# Patient Record
Sex: FEMALE
[De-identification: names, ages, dates, MRNs, and addresses within clinical notes are randomized per-mention and may not be internally consistent; named-entity substitution may affect disease eponyms.]

---

## 2019-09-24 ENCOUNTER — HOSPITAL ENCOUNTER (OUTPATIENT)
Dept: HOSPITAL 88 - ER | Age: 84
Setting detail: OBSERVATION
LOS: 2 days | Discharge: HOME | End: 2019-09-26
Attending: INTERNAL MEDICINE | Admitting: INTERNAL MEDICINE
Payer: MEDICARE

## 2019-09-24 VITALS — DIASTOLIC BLOOD PRESSURE: 62 MMHG | SYSTOLIC BLOOD PRESSURE: 98 MMHG

## 2019-09-24 VITALS — SYSTOLIC BLOOD PRESSURE: 111 MMHG | DIASTOLIC BLOOD PRESSURE: 66 MMHG

## 2019-09-24 VITALS — HEIGHT: 61 IN | WEIGHT: 116.75 LBS | BODY MASS INDEX: 22.04 KG/M2

## 2019-09-24 VITALS — DIASTOLIC BLOOD PRESSURE: 66 MMHG | SYSTOLIC BLOOD PRESSURE: 111 MMHG

## 2019-09-24 DIAGNOSIS — E78.5: ICD-10-CM

## 2019-09-24 DIAGNOSIS — I48.0: Primary | ICD-10-CM

## 2019-09-24 DIAGNOSIS — R53.83: ICD-10-CM

## 2019-09-24 DIAGNOSIS — I10: ICD-10-CM

## 2019-09-24 LAB
ALBUMIN SERPL-MCNC: 3.4 G/DL (ref 3.5–5)
ALBUMIN/GLOB SERPL: 0.8 {RATIO} (ref 0.8–2)
ALP SERPL-CCNC: 80 IU/L (ref 40–150)
ALT SERPL-CCNC: 13 IU/L (ref 0–55)
ANION GAP SERPL CALC-SCNC: 10.4 MMOL/L (ref 8–16)
BACTERIA URNS QL MICRO: (no result) /HPF
BASOPHILS # BLD AUTO: 0 10*3/UL (ref 0–0.1)
BASOPHILS NFR BLD AUTO: 0.4 % (ref 0–1)
BILIRUB UR QL: NEGATIVE
BUN SERPL-MCNC: 19 MG/DL (ref 7–26)
BUN/CREAT SERPL: 22 (ref 6–25)
CALCIUM SERPL-MCNC: 9.2 MG/DL (ref 8.4–10.2)
CHLORIDE SERPL-SCNC: 100 MMOL/L (ref 98–107)
CK MB SERPL-MCNC: 1.1 NG/ML (ref 0–5)
CK MB SERPL-MCNC: 1.1 NG/ML (ref 0–5)
CK SERPL-CCNC: 51 IU/L (ref 29–168)
CK SERPL-CCNC: 52 IU/L (ref 29–168)
CLARITY UR: CLEAR
CO2 SERPL-SCNC: 31 MMOL/L (ref 22–29)
COLOR UR: YELLOW
DEPRECATED APTT PLAS QN: 25.7 SECONDS (ref 23.8–35.5)
DEPRECATED INR PLAS: 0.99
DEPRECATED NEUTROPHILS # BLD AUTO: 3.3 10*3/UL (ref 2.1–6.9)
EGFRCR SERPLBLD CKD-EPI 2021: > 60 ML/MIN (ref 60–?)
EOSINOPHIL # BLD AUTO: 0 10*3/UL (ref 0–0.4)
EOSINOPHIL NFR BLD AUTO: 0.8 % (ref 0–6)
EPI CELLS URNS QL MICRO: (no result) /LPF
ERYTHROCYTE [DISTWIDTH] IN CORD BLOOD: 13.9 % (ref 11.7–14.4)
GLOBULIN PLAS-MCNC: 4.1 G/DL (ref 2.3–3.5)
GLUCOSE SERPLBLD-MCNC: 109 MG/DL (ref 74–118)
HCT VFR BLD AUTO: 38.6 % (ref 34.2–44.1)
HGB BLD-MCNC: 12.3 G/DL (ref 12–16)
KETONES UR QL STRIP.AUTO: NEGATIVE
LEUKOCYTE ESTERASE UR QL STRIP.AUTO: (no result)
LYMPHOCYTES # BLD: 1.2 10*3/UL (ref 1–3.2)
LYMPHOCYTES NFR BLD AUTO: 22.9 % (ref 18–39.1)
MCH RBC QN AUTO: 28.9 PG (ref 28–32)
MCHC RBC AUTO-ENTMCNC: 31.9 G/DL (ref 31–35)
MCV RBC AUTO: 90.8 FL (ref 81–99)
MONOCYTES # BLD AUTO: 0.6 10*3/UL (ref 0.2–0.8)
MONOCYTES NFR BLD AUTO: 11.5 % (ref 4.4–11.3)
NEUTS SEG NFR BLD AUTO: 64.2 % (ref 38.7–80)
NITRITE UR QL STRIP.AUTO: NEGATIVE
PLATELET # BLD AUTO: 384 X10E3/UL (ref 140–360)
POTASSIUM SERPL-SCNC: 3.4 MMOL/L (ref 3.5–5.1)
PROT UR QL STRIP.AUTO: (no result)
PROTHROMBIN TIME: 13.6 SECONDS (ref 11.9–14.5)
RBC # BLD AUTO: 4.25 X10E6/UL (ref 3.6–5.1)
SODIUM SERPL-SCNC: 138 MMOL/L (ref 136–145)
SP GR UR STRIP: 1.02 (ref 1.01–1.02)
UROBILINOGEN UR STRIP-MCNC: 0.2 MG/DL (ref 0.2–1)
WBC #/AREA URNS HPF: (no result) /HPF (ref 0–5)

## 2019-09-24 PROCEDURE — 71045 X-RAY EXAM CHEST 1 VIEW: CPT

## 2019-09-24 PROCEDURE — 85025 COMPLETE CBC W/AUTO DIFF WBC: CPT

## 2019-09-24 PROCEDURE — 94640 AIRWAY INHALATION TREATMENT: CPT

## 2019-09-24 PROCEDURE — 93005 ELECTROCARDIOGRAM TRACING: CPT

## 2019-09-24 PROCEDURE — 80053 COMPREHEN METABOLIC PANEL: CPT

## 2019-09-24 PROCEDURE — 85610 PROTHROMBIN TIME: CPT

## 2019-09-24 PROCEDURE — 82553 CREATINE MB FRACTION: CPT

## 2019-09-24 PROCEDURE — 83880 ASSAY OF NATRIURETIC PEPTIDE: CPT

## 2019-09-24 PROCEDURE — 85730 THROMBOPLASTIN TIME PARTIAL: CPT

## 2019-09-24 PROCEDURE — 36415 COLL VENOUS BLD VENIPUNCTURE: CPT

## 2019-09-24 PROCEDURE — 84484 ASSAY OF TROPONIN QUANT: CPT

## 2019-09-24 PROCEDURE — 93306 TTE W/DOPPLER COMPLETE: CPT

## 2019-09-24 PROCEDURE — 82550 ASSAY OF CK (CPK): CPT

## 2019-09-24 PROCEDURE — 99285 EMERGENCY DEPT VISIT HI MDM: CPT

## 2019-09-24 PROCEDURE — 81001 URINALYSIS AUTO W/SCOPE: CPT

## 2019-09-24 PROCEDURE — 84443 ASSAY THYROID STIM HORMONE: CPT

## 2019-09-24 RX ADMIN — AMIODARONE HYDROCHLORIDE SCH MG: 200 TABLET ORAL at 16:23

## 2019-09-24 RX ADMIN — APIXABAN SCH MG: 2.5 TABLET, FILM COATED ORAL at 16:23

## 2019-09-24 NOTE — XMS REPORT
Patient Summary Document

                             Created on: 2019



SHARLENE CARTER

External Reference #: [id number]

: 1935

Sex: Female



Demographics







                          Address                   [address] [place]  [address]

 

                          Home Phone                [phone]

 

                          Preferred Language        Unknown

 

                          Marital Status            Unknown

 

                          Ep[employer] Affiliation     Unknown

 

                          Race                      Unknown

 

                          Ethnic Group              Unknown





Author







                          [facility]                    [facility]

 

                          [facility]

 

                          Address                   Unknown

 

                          Phone                     Unavailable







Care Team Providers







                    Care Team Member Name    Role                Phone

 

                    WESLY QIU    Unavailable         Unavailable







Problems

This patient has no known problems.



Allergies, Adverse Reactions, Alerts

This patient has no known allergies or adverse reactions.



Medications

This patient has no known medications.



Results







           Test Description    Test Time    Test Comments    Text Results    Atomic Results    Result

 Comments

 

                CHEST SINGLE (PORTABLE)    2019 11:29:00                                                   

                                                       [place] [address]      Patient Name: SHARLENE CARTER                        
          MR #: [id number]                     : 1935                  
                Age/Sex: 83/F  Acct #: [id number]                             
Req #: [id number]  Adm Physician:                                               
      Ordered by: FRANCI BAEZA NP                            Report #: [id number]
[id number]        Location: ER                                      Room/Bed:         
           
___________________________________________________________________________________________________
   Procedure: [id number] DX/CHEST SINGLE (PORTABLE)  Exam Date:                  
          Exam Time:                                               REPORT 
STATUS: Signed    EXAMINATION:  CHEST SINGLE (PORTABLE)          INDICATION: Kalye
rtness of breath      COMPARISON: None           FINDINGS:      LINES/TUBES:None
     LUNGS:The lungs are hyperinflated. Prominent right apical pleural 
parenchymal   thickening/scarring. Bilateral upper lobe predominant tubular 
bronchiectasis   and interstitial scarring. Subsegmental atelectasis at the left
lung base.      PLEURA:No pleural effusion or pneumothorax.      MEDIASTINUM:The
cardiomediastinal silhouette appears mildlyAtherosclerotic   calcifications of 
the thoracic aorta.      BONES/SOFT TISSUES:No acute osseous injury.      
ABDOMEN:No free air under the diaphragm.         IMPRESSION:    Hyperinflated 
lungs with bilateral upper lobe predominant tubular   bronchiectasis and 
interstitial scarring consistent with chronic lung disease.   Prominent right 
apical pleural parenchymal thickening/scarring.      Mild cardiomegaly.      
Signed by: Umberto Ayala MD on 2019 11:35 AM        Dictated By: UMBERTO AYALA MD  Electronically Signed By: UMBERTO AYALA MD on 19 1135  Transcribed By: 
KAYLA on 19 1135       COPY TO:   FRANCI BAEZA NP

## 2019-09-24 NOTE — NUR
PT RECEIVED FROM ER VIA WHEEL CHAIR. AAOX3. FAMILY AT BEDSIDE. EDUCATED PT ABOUT FALL 
PRECAUTIONS. PT VERBALIZED UNDERSTANDING.  CALL LIGHT WITH IN EASY REACH. INSTRUCTED PT TO 
USE CALL LIGHT FOR ANY NEEDS. BED IS LOCKED. PT REFUSED BED ALARM. PT DENIES NEEDS AT THIS 
TIME.

## 2019-09-24 NOTE — NUR
EKG PERFORMED AT BEDSIDE, NOTED THAT PT IS IN A FIB WITH RVR, INFORMED DR. GRANGER; RECEIVED VERBAL ORDERS FOR DILTIAZEM 20 MG IVP ONCE, WILL ADMINISTER 
AND WILL CONTINUE TO MONITOR PT.

## 2019-09-24 NOTE — DIAGNOSTIC IMAGING REPORT
EXAMINATION:  CHEST SINGLE (PORTABLE)    



INDICATION: Shortness of breath



COMPARISON: None

     

FINDINGS:



LINES/TUBES:None



LUNGS:The lungs are hyperinflated. Prominent right apical pleural parenchymal

thickening/scarring. Bilateral upper lobe predominant tubular bronchiectasis

and interstitial scarring. Subsegmental atelectasis at the left lung base.



PLEURA:No pleural effusion or pneumothorax.



MEDIASTINUM:The cardiomediastinal silhouette appears mildlyAtherosclerotic

calcifications of the thoracic aorta.



BONES/SOFT TISSUES:No acute osseous injury.



ABDOMEN:No free air under the diaphragm.





IMPRESSION: 

Hyperinflated lungs with bilateral upper lobe predominant tubular

bronchiectasis and interstitial scarring consistent with chronic lung disease.

Prominent right apical pleural parenchymal thickening/scarring.



Mild cardiomegaly.



Signed by: Gudelia Perdomo MD on 9/24/2019 11:35 AM

## 2019-09-24 NOTE — CONSULTATION
DATE OF CONSULTATION:  

 

Cardiology consultation 

 

CHIEF COMPLAINT:  The patient is an 83-year-old with palpitations and fatigue.

 

HISTORY OF PRESENT ILLNESS:  The patient is an 83-year-old, who came to the emergency

room with the heart beating irregular and feeling very fatigued.  The patient was noted

to be in atrial fibrillation.  The patient has had dyspnea, but no shortness of breath. 

 

PAST MEDICAL HISTORY:  Significant for,

1. Hypertension.  

2. Hyperlipidemia.

 

MEDICATIONS:  At home include amlodipine and atorvastatin.

 

SOCIAL HISTORY:  The patient does not drink and does not smoke.

 

FAMILY HISTORY:  There is no known family history of coronary artery disease.

 

PHYSICAL EXAMINATION:

GENERAL:  The patient is an older female, in no obvious distress. 

VITAL SIGNS:  Include a temperature of 98.6, pulse of 109, and blood pressure of 98/62. 

HEAD, EARS, EYES, NOSE, AND THROAT EXAM:  The patient's cranium was normocephalic and

atraumatic.  Extraocular muscles were intact.  Sclerae were anicteric.  Pupils were

equal, round, reactive to light.  There was no pallor or cyanosis of the oral mucosa.

There was no erythema or edema of the throat. 

NECK:  Supple.  No jugular venous distention. 

CHEST EXAM:  Demonstrated rhonchi bilaterally. 

CARDIAC EXAM:  Demonstrated an irregularly irregular rhythm with a short 2/6 systolic

murmur. 

ABDOMINAL EXAM:  Demonstrated good bowel sounds.  No tenderness and no masses. 

EXTREMITIES:  There is no clubbing, no cyanosis, and no edema.

DIAGNOSTIC DATA:  The patient's EKG demonstrated atrial fibrillation with nonspecific ST

and T-wave changes. 

 

IMPRESSION:  The patient is an 83-year-old with new onset atrial fibrillation.

 

RECOMMENDATIONS:  As Follows:

1. The patient will need to be started on Eliquis.

2. The patient will need to be started on amiodarone.

3. Thyroid function tests have been ordered.

4. An echocardiogram was done, which demonstrated an ejection fraction of 55%.

 

 

 

 

______________________________

Vicente Beltran MD

 

DSH/MODL

D:  09/24/2019 16:30:25

T:  09/24/2019 23:44:48

Job #:  [id number]

 

cc:            Tomasz Aguilar MD

## 2019-09-24 NOTE — NUR
Got bedside shift report from previous nurse. Call light within reach. Patient in bed. 
Family at bedside. Patient in no pain or distress.

## 2019-09-25 VITALS — SYSTOLIC BLOOD PRESSURE: 102 MMHG | DIASTOLIC BLOOD PRESSURE: 66 MMHG

## 2019-09-25 VITALS — DIASTOLIC BLOOD PRESSURE: 60 MMHG | SYSTOLIC BLOOD PRESSURE: 97 MMHG

## 2019-09-25 VITALS — SYSTOLIC BLOOD PRESSURE: 94 MMHG | DIASTOLIC BLOOD PRESSURE: 52 MMHG

## 2019-09-25 VITALS — SYSTOLIC BLOOD PRESSURE: 94 MMHG | DIASTOLIC BLOOD PRESSURE: 73 MMHG

## 2019-09-25 VITALS — DIASTOLIC BLOOD PRESSURE: 52 MMHG | SYSTOLIC BLOOD PRESSURE: 94 MMHG

## 2019-09-25 VITALS — DIASTOLIC BLOOD PRESSURE: 71 MMHG | SYSTOLIC BLOOD PRESSURE: 103 MMHG

## 2019-09-25 VITALS — DIASTOLIC BLOOD PRESSURE: 63 MMHG | SYSTOLIC BLOOD PRESSURE: 98 MMHG

## 2019-09-25 VITALS — DIASTOLIC BLOOD PRESSURE: 73 MMHG | SYSTOLIC BLOOD PRESSURE: 94 MMHG

## 2019-09-25 LAB
ALBUMIN SERPL-MCNC: 2.9 G/DL (ref 3.5–5)
ALBUMIN/GLOB SERPL: 0.8 {RATIO} (ref 0.8–2)
ALP SERPL-CCNC: 64 IU/L (ref 40–150)
ALT SERPL-CCNC: 13 IU/L (ref 0–55)
ANION GAP SERPL CALC-SCNC: 10.9 MMOL/L (ref 8–16)
BASOPHILS # BLD AUTO: 0 10*3/UL (ref 0–0.1)
BASOPHILS NFR BLD AUTO: 0 % (ref 0–1)
BUN SERPL-MCNC: 18 MG/DL (ref 7–26)
BUN/CREAT SERPL: 25 (ref 6–25)
CALCIUM SERPL-MCNC: 8.5 MG/DL (ref 8.4–10.2)
CHLORIDE SERPL-SCNC: 103 MMOL/L (ref 98–107)
CK MB SERPL-MCNC: 1 NG/ML (ref 0–5)
CK MB SERPL-MCNC: 1.8 NG/ML (ref 0–5)
CK SERPL-CCNC: 39 IU/L (ref 29–168)
CK SERPL-CCNC: 52 IU/L (ref 29–168)
CO2 SERPL-SCNC: 27 MMOL/L (ref 22–29)
DEPRECATED NEUTROPHILS # BLD AUTO: 3.9 10*3/UL (ref 2.1–6.9)
EGFRCR SERPLBLD CKD-EPI 2021: > 60 ML/MIN (ref 60–?)
EOSINOPHIL # BLD AUTO: 0 10*3/UL (ref 0–0.4)
EOSINOPHIL NFR BLD AUTO: 0 % (ref 0–6)
ERYTHROCYTE [DISTWIDTH] IN CORD BLOOD: 14 % (ref 11.7–14.4)
GLOBULIN PLAS-MCNC: 3.5 G/DL (ref 2.3–3.5)
GLUCOSE SERPLBLD-MCNC: 127 MG/DL (ref 74–118)
HCT VFR BLD AUTO: 35.2 % (ref 34.2–44.1)
HGB BLD-MCNC: 11.2 G/DL (ref 12–16)
LYMPHOCYTES # BLD: 0.6 10*3/UL (ref 1–3.2)
LYMPHOCYTES NFR BLD AUTO: 14 % (ref 18–39.1)
MCH RBC QN AUTO: 28.6 PG (ref 28–32)
MCHC RBC AUTO-ENTMCNC: 31.8 G/DL (ref 31–35)
MCV RBC AUTO: 89.8 FL (ref 81–99)
MONOCYTES # BLD AUTO: 0.1 10*3/UL (ref 0.2–0.8)
MONOCYTES NFR BLD AUTO: 1.5 % (ref 4.4–11.3)
NEUTS SEG NFR BLD AUTO: 84.1 % (ref 38.7–80)
PLATELET # BLD AUTO: 336 X10E3/UL (ref 140–360)
POTASSIUM SERPL-SCNC: 3.9 MMOL/L (ref 3.5–5.1)
RBC # BLD AUTO: 3.92 X10E6/UL (ref 3.6–5.1)
SODIUM SERPL-SCNC: 137 MMOL/L (ref 136–145)

## 2019-09-25 RX ADMIN — APIXABAN SCH MG: 2.5 TABLET, FILM COATED ORAL at 17:42

## 2019-09-25 RX ADMIN — AMIODARONE HYDROCHLORIDE SCH MG: 200 TABLET ORAL at 17:42

## 2019-09-25 RX ADMIN — AMIODARONE HYDROCHLORIDE SCH MG: 200 TABLET ORAL at 08:50

## 2019-09-25 RX ADMIN — APIXABAN SCH MG: 2.5 TABLET, FILM COATED ORAL at 08:57

## 2019-09-25 NOTE — NUR
Spoke to Dr. Beltran regarding plan. States pt can discharge home from his standpoint. JASON Calvo and JASON Covington was notified. States they will call Dr. Aguilar.

## 2019-09-25 NOTE — NUR
Bedside shift report given to oncoming nurse. Call light within reach. Family at bedside. 
Patient in no pain or distress.

## 2019-09-25 NOTE — NUR
Received bedside report from night nurse. Patient resting in bed, no signs of distress or 
c/o pain at this time. All safety measures in place. Family at bedside. Will continue to 
monitor.

## 2019-09-25 NOTE — NUR
Bedside report given to night nurse. Patient resting in bed, in stable condition, no signs 
of distress or c/o pain at this time. All safety measures in place. Family at bedside.

## 2019-09-25 NOTE — NUR
Called Dr. Aguilar and left message regarding patient being okay to DC home per Dr. Beltran. 
Awaiting return call.

## 2019-09-25 NOTE — HISTORY AND PHYSICAL
PRIMARY CARE PHYSICIAN:  Silvia Conn MD

 

CONSULTANT:  Dr. Vicente Beltran.

 

CHIEF COMPLAINT:  New onset atrial fibrillation.

 

HISTORY OF PRESENT ILLNESS:  The patient is an 83-year-old female with new onset atrial

fibrillation.  The patient is otherwise stable at this time.  The patient has increase

in fatigue.  She was very active prior to the atrial fibrillation.  She failed

palpitation.  The patient is otherwise stable at this time. 

 

PAST MEDICAL HISTORY:  

1. Hypertension.

2. Dyslipidemia.

 

PAST SURGICAL HISTORY:  Noncontributory.

 

SOCIAL HISTORY:  The patient does not smoke or use alcohol.  No regular drugs.

 

FAMILY HISTORY:  Significant for coronary disease.

 

ALLERGIES:  NO KNOWN ALLERGIES.

 

HOME MEDICATIONS:  List is reviewed.

 

REVIEW OF SYSTEMS:

Palpitation and fatigue.

 

PHYSICAL EXAMINATION:

VITAL SIGNS:  Temperature is 98, blood pressure 103/71, pulse rate 89, and respirations

18. 

GENERAL:  The patient is not in acute distress. 

HEENT:  Normocephalic and atraumatic.  Pupils reactive.  Anicteric. 

NECK:  Supple grossly. 

PULMONARY:  Diminished breath sounds bilaterally. 

CARDIOVASCULAR:  S1, S2.  Irregularly irregular rate controlled now. 

ABDOMEN:  Soft. 

EXTREMITIES:  No cyanosis or edema. 

NEUROLOGIC:  No gross focal deficit.

LABORATORY DATA:  Sodium is 137, potassium 3.9, chloride 103, bicarb 27, BUN 18, and

creatinine 0.7.  Glucose 127. 

 

WBC is 4.6.  Hemoglobin 11.2 and hematocrit 35.2.  Platelets are 336.

 

IMPRESSION:  

1. New onset atrial fibrillation.

2. Hypertension.

 

PLAN:  Continue with Eliquis and diltiazem along with digoxin and amiodarone loading

dosing.  We will check the patient's thyroid function test.  Resume home medications. 

 

 

 

 

______________________________

MD HERMAN Evans/JUNAIDL

D:  09/25/2019 09:05:29

T:  09/25/2019 15:50:51

Job #:  [id number]

## 2019-09-26 VITALS — SYSTOLIC BLOOD PRESSURE: 122 MMHG | DIASTOLIC BLOOD PRESSURE: 61 MMHG

## 2019-09-26 VITALS — DIASTOLIC BLOOD PRESSURE: 74 MMHG | SYSTOLIC BLOOD PRESSURE: 106 MMHG

## 2019-09-26 VITALS — SYSTOLIC BLOOD PRESSURE: 116 MMHG | DIASTOLIC BLOOD PRESSURE: 73 MMHG

## 2019-09-26 RX ADMIN — APIXABAN SCH MG: 2.5 TABLET, FILM COATED ORAL at 07:28

## 2019-09-26 RX ADMIN — AMIODARONE HYDROCHLORIDE SCH MG: 200 TABLET ORAL at 07:28

## 2019-09-26 NOTE — NUR
RECEIVED A CALL FROM TELE THAT PT WAS AFIB WITH RVR. CHECKED ON PT, TECH AT BEDSIDE TAKING 
VITALS. B/P 122/61 AND HR 85. PT WAS ASYMPTOMATIC. THIS NURSE HEARD HB THAT WAS IRREGULAR 
BUT WITH STRONG PULSE. FAMILY AT BEDSIDE. TELE BOX SHOWING HR 'S AND AFIB. WILL 
CONTINUE TO MONITOR.

## 2019-09-26 NOTE — NUR
Received bedside report from night nurse. Patient awake and sitting up in bed, no signs of 
distress or c/o pain at this time. All safety measures in place. Family at bedside. Will 
continue to monitor.

## 2019-09-26 NOTE — NUR
Rechecked O2 sats, sats 94-95% at rest. Educated patient on no longer needed oxygen and to 
call nurse if SOB occurs. Family and patient verbalized understanding.

## 2019-09-26 NOTE — NUR
Received orders to DC patient home. Dr. Aguilar left written RX for amiodarone 200 mg BID 
(#60-no RF) and Eliquis 2.5 mg BID (#60-2 RF).